# Patient Record
Sex: FEMALE | Race: WHITE
[De-identification: names, ages, dates, MRNs, and addresses within clinical notes are randomized per-mention and may not be internally consistent; named-entity substitution may affect disease eponyms.]

---

## 2020-02-09 ENCOUNTER — HOSPITAL ENCOUNTER (EMERGENCY)
Dept: HOSPITAL 11 - JP.ED | Age: 25
Discharge: HOME | End: 2020-02-09
Payer: COMMERCIAL

## 2020-02-09 DIAGNOSIS — Z88.1: ICD-10-CM

## 2020-02-09 DIAGNOSIS — Z88.5: ICD-10-CM

## 2020-02-09 DIAGNOSIS — K58.9: Primary | ICD-10-CM

## 2020-02-09 NOTE — EDM.PDOC
ED HPI GENERAL MEDICAL PROBLEM





- General


Chief Complaint: Abdominal Pain


Stated Complaint: ADBOMINAL PAIN


Time Seen by Provider: 02/09/20 11:05


Source of Information: Reports: Patient


History Limitations: Reports: No Limitations





- History of Present Illness


INITIAL COMMENTS - FREE TEXT/NARRATIVE: 





p arrived with a history of recurrent abdomanal pain which has been going on 

since she was about 10. When she has a episode she starts with lower abdomanal 

pain which moves up. She does not vomit. She has never been told what causes 

the pain. Her bm are regular normally . Whe n she has the pain she goe from a 

hard stool to a very loose one. She has not been told that she has a irritable 

bowel. She has cut iout gluten and milk products and that does not help. 


Onset: Other (started about 1 week ago. )


Duration: Hour(s):


Location: Reports: Abdomen


Associated Symptoms: Reports: No Other Symptoms


  ** Abdominal


Pain Score (Numeric/FACES): 4





- Related Data


 Allergies











Allergy/AdvReac Type Severity Reaction Status Date / Time


 


sulfamethoxazole AdvReac  Other Verified 02/09/20 10:40





[From Bactrim]     


 


trimethoprim [From Bactrim] AdvReac  Other Verified 02/09/20 10:40











Home Meds: 


 Home Meds





Levonorgestrel-Ethin Estradiol [Levonest-28 Tablet] 1 tab PO DAILY 02/09/20 [

History]


Multivitamin [Multivitamins] 1 tab PO DAILY 02/09/20 [History]











Past Medical History


HEENT History: Reports: Impaired Vision


Neurological History: Reports: None





- Past Surgical History


Head Surgeries/Procedures: Reports: None


HEENT Surgical History: Reports: Adenoidectomy, Tonsillectomy


Neurological Surgical History: Reports: Spinal Fusion


Dermatological Surgical History: Reports: None





Social & Family History





- Tobacco Use


Smoking Status *Q: Never Smoker


Second Hand Smoke Exposure: No





- Caffeine Use


Caffeine Use: Reports: Coffee





- Recreational Drug Use


Recreational Drug Use: No





ED ROS GENERAL





- Review of Systems


Review Of Systems: See Below


Constitutional: Reports: No Symptoms


HEENT: Reports: No Symptoms


Respiratory: Reports: No Symptoms


Cardiovascular: Reports: No Symptoms


Endocrine: Reports: No Symptoms


GI/Abdominal: Reports: Abdominal Pain, Other (pt is having episodes of 

abdomanal painwhich seem to be a lttle more frequent. She has a concern that 

she could have a GB problem because of the bloating that does occur. She states 

the pain starts in the lower abndoman and moves up. She states her stools do 

vary alot from loose to very hard. k)


: Reports: No Symptoms


Musculoskeletal: Reports: No Symptoms


Skin: Reports: No Symptoms





ED EXAM, GI/ABD





- Physical Exam


Exam: See Below


Text/Narrative:: 





Pt has somewhat of a chronic history of recurrent abdomanal pain. This starts 

in the lower abdoman and then moves up  She does have varible stools. 


Exam Limited By: No Limitations


General Appearance: Alert, Anxious, Mild Distress


Ears: Normal TMs


Nose: Normal Inspection


Throat/Mouth: Normal Inspection


Head: Atraumatic


Neck: Normal Inspection


Respiratory/Chest: No Respiratory Distress


Cardiovascular: Regular Rate, Rhythm


GI/Abdominal Exam: Soft, Other ( diffuse lower abdomanal tenderness. )


 (Female) Exam: Deferred


Rectal (Female) Exam: Deferred


Back Exam: Normal Inspection


Extremities: Normal Inspection


Neurological: Alert, Oriented, Normal Cognition


Psychiatric: Anxious





Course





- Vital Signs


Last Recorded V/S: 


 Last Vital Signs











Temp  36.6 C   02/09/20 10:48


 


Pulse  67   02/09/20 10:48


 


Resp  18   02/09/20 10:48


 


BP  136/83   02/09/20 10:48


 


Pulse Ox  99   02/09/20 10:48














- Orders/Labs/Meds


Orders: 


 Active Orders 24 hr











 Category Date Time Status


 


 Abdomen Ltd [US] Stat Exams  02/09/20 11:38 Ordered











Labs: 


 Laboratory Tests











  02/09/20 02/09/20 02/09/20 Range/Units





  11:09 11:11 11:11 


 


WBC    7.3  (4.5-11.0)  K/uL


 


RBC    4.82  (3.30-5.50)  M/uL


 


Hgb    13.3  (12.0-15.0)  g/dL


 


Hct    41.6  (36.0-48.0)  %


 


MCV    86  (80-98)  fL


 


MCH    28  (27-31)  pg


 


MCHC    32  (32-36)  %


 


Plt Count    280  (150-400)  K/uL


 


Neut % (Auto)    47  (36-66)  %


 


Lymph % (Auto)    44  (24-44)  %


 


Mono % (Auto)    6  (2-6)  %


 


Eos % (Auto)    2  (2-4)  %


 


Baso % (Auto)    1  (0-1)  %


 


Sodium     (140-148)  mmol/L


 


Potassium     (3.6-5.2)  mmol/L


 


Chloride     (100-108)  mmol/L


 


Carbon Dioxide     (21-32)  mmol/L


 


Anion Gap     (5.0-14.0)  mmol/L


 


BUN     (7-18)  mg/dL


 


Creatinine     (0.6-1.0)  mg/dL


 


Est Cr Clr Drug Dosing     mL/min


 


Estimated GFR (MDRD)     (>60)  


 


Glucose     ()  mg/dL


 


Calcium     (8.5-10.1)  mg/dL


 


Total Bilirubin     (0.2-1.0)  mg/dL


 


AST     (15-37)  U/L


 


ALT     (12-78)  U/L


 


Alkaline Phosphatase     ()  U/L


 


C-Reactive Protein   0.96 H   (0.0-0.3)  mg/dL


 


Total Protein     (6.4-8.2)  g/dL


 


Albumin     (3.4-5.0)  g/dL


 


Globulin     (2.3-3.5)  g/dL


 


Albumin/Globulin Ratio     (1.2-2.2)  


 


Urine Color  Yellow    (YELLOW)  


 


Urine Appearance  Clear    (CLEAR)  


 


Urine pH  7.5    (5.0-8.0)  


 


Ur Specific Gravity  1.015    (1.008-1.030)  


 


Urine Protein  Negative    (NEGATIVE)  mg/dL


 


Urine Glucose (UA)  Negative    (NEGATIVE)  mg/dL


 


Urine Ketones  Negative    (NEGATIVE)  mg/dL


 


Urine Occult Blood  Negative    (NEGATIVE)  


 


Urine Nitrite  Negative    (NEGATIVE)  


 


Urine Bilirubin  Negative    (NEGATIVE)  


 


Urine Urobilinogen  0.2    (0.2-1.0)  EU/dL


 


Ur Leukocyte Esterase  Negative    (NEGATIVE)  


 


Urine RBC  Not seen    (0-5)  


 


Urine WBC  Not seen    (0-5)  


 


Ur Epithelial Cells  Rare    


 


Amorphous Sediment  Not seen    


 


Urine Bacteria  Not seen    


 


Urine Mucus  Not seen    














  02/09/20 Range/Units





  11:11 


 


WBC   (4.5-11.0)  K/uL


 


RBC   (3.30-5.50)  M/uL


 


Hgb   (12.0-15.0)  g/dL


 


Hct   (36.0-48.0)  %


 


MCV   (80-98)  fL


 


MCH   (27-31)  pg


 


MCHC   (32-36)  %


 


Plt Count   (150-400)  K/uL


 


Neut % (Auto)   (36-66)  %


 


Lymph % (Auto)   (24-44)  %


 


Mono % (Auto)   (2-6)  %


 


Eos % (Auto)   (2-4)  %


 


Baso % (Auto)   (0-1)  %


 


Sodium  141  (140-148)  mmol/L


 


Potassium  3.8  (3.6-5.2)  mmol/L


 


Chloride  105  (100-108)  mmol/L


 


Carbon Dioxide  28  (21-32)  mmol/L


 


Anion Gap  8.4  (5.0-14.0)  mmol/L


 


BUN  10  (7-18)  mg/dL


 


Creatinine  1.0  (0.6-1.0)  mg/dL


 


Est Cr Clr Drug Dosing  61.23  mL/min


 


Estimated GFR (MDRD)  > 60  (>60)  


 


Glucose  91  ()  mg/dL


 


Calcium  8.7  (8.5-10.1)  mg/dL


 


Total Bilirubin  0.3  (0.2-1.0)  mg/dL


 


AST  16  (15-37)  U/L


 


ALT  29  (12-78)  U/L


 


Alkaline Phosphatase  76  ()  U/L


 


C-Reactive Protein   (0.0-0.3)  mg/dL


 


Total Protein  7.2  (6.4-8.2)  g/dL


 


Albumin  3.4  (3.4-5.0)  g/dL


 


Globulin  3.8 H  (2.3-3.5)  g/dL


 


Albumin/Globulin Ratio  0.9 L  (1.2-2.2)  


 


Urine Color   (YELLOW)  


 


Urine Appearance   (CLEAR)  


 


Urine pH   (5.0-8.0)  


 


Ur Specific Gravity   (1.008-1.030)  


 


Urine Protein   (NEGATIVE)  mg/dL


 


Urine Glucose (UA)   (NEGATIVE)  mg/dL


 


Urine Ketones   (NEGATIVE)  mg/dL


 


Urine Occult Blood   (NEGATIVE)  


 


Urine Nitrite   (NEGATIVE)  


 


Urine Bilirubin   (NEGATIVE)  


 


Urine Urobilinogen   (0.2-1.0)  EU/dL


 


Ur Leukocyte Esterase   (NEGATIVE)  


 


Urine RBC   (0-5)  


 


Urine WBC   (0-5)  


 


Ur Epithelial Cells   


 


Amorphous Sediment   


 


Urine Bacteria   


 


Urine Mucus   














- Re-Assessments/Exams


Free Text/Narrative Re-Assessment/Exam: 





02/09/20 13:36


lab work was normal  pt had a US of the  area and this was normal. 





Departure





- Departure


Time of Disposition: 13:37


Disposition: Home, Self-Care 01


Condition: Fair


Clinical Impression: 


 Irritable bowel syndrome








- Discharge Information


Referrals: 


Brittany Choi MD [Primary Care Provider] - 


Forms:  ED Department Discharge


Care Plan Goals: 


push fluid, increase fiber in the diet, fibercon 1 tab bid. At this time use 

mom and clear out the bowel then increase the fiber, regular exercise. If not 

better see Dr Choi at the clinic and possibly have a referal to 

Gastroenterology. 





Sepsis Event Note





- Evaluation


Sepsis Screening Result: No Definite Risk





- Focused Exam


Vital Signs: 


 Vital Signs











  Temp Pulse Resp BP Pulse Ox


 


 02/09/20 10:48  36.6 C  67  18  136/83  99


 


 02/09/20 10:44  36.6 C  67  18  136/83  99











Date Exam was Performed: 02/09/20


Time Exam was Performed: 13:23





- My Orders


Last 24 Hours: 


My Active Orders





02/09/20 11:38


Abdomen Ltd [US] Stat 














- Assessment/Plan


Last 24 Hours: 


My Active Orders





02/09/20 11:38


Abdomen Ltd [US] Stat

## 2020-02-09 NOTE — CRLUS
INDICATION:



Right upper quadrant abdominal pain. Nausea.



TECHNIQUE:



Ultrasound abdomen limited. Sonographic images of the right upper quadrant 

were obtained using gray-scale and color Doppler images.



COMPARISON:



None



FINDINGS:



Liver: Normal in size and echotexture. No masses. No intrahepatic biliary 

dilatation. 



Gallbladder: No stones or sludge. Normal wall thickness. No pericholecystic 

fluid. 



Common bile duct: 6 mm. 



Pancreas: Unremarkable as imaged. The tail of the pancreas is suboptimally 

visualized. Normal directional flow in the splenic vein. 



Right kidney: 10.4 cm in length. Normal echotexture and cortex. No masses, 

stones, or hydronephrosis. 



Vasculature: Visualized aorta and IVC are normal. No free fluid 

demonstrated. 



IMPRESSION:



Unremarkable right upper quadrant ultrasound.



Dictated by Jose Burgos MD @ 2/9/2020 1:46:23 PM



Dictated by: Jose Burgos MD @ 02/09/2020 13:46:39



(Electronically Signed)

## 2022-07-31 ENCOUNTER — HOSPITAL ENCOUNTER (EMERGENCY)
Dept: HOSPITAL 11 - JP.ED | Age: 27
Discharge: HOME | End: 2022-07-31
Payer: COMMERCIAL

## 2022-07-31 DIAGNOSIS — Z79.899: ICD-10-CM

## 2022-07-31 DIAGNOSIS — E06.3: ICD-10-CM

## 2022-07-31 DIAGNOSIS — T78.40XA: Primary | ICD-10-CM

## 2022-07-31 DIAGNOSIS — Z88.2: ICD-10-CM

## 2022-07-31 DIAGNOSIS — Z86.16: ICD-10-CM

## 2022-07-31 PROCEDURE — 96375 TX/PRO/DX INJ NEW DRUG ADDON: CPT

## 2022-07-31 PROCEDURE — 99283 EMERGENCY DEPT VISIT LOW MDM: CPT

## 2022-07-31 PROCEDURE — 96374 THER/PROPH/DIAG INJ IV PUSH: CPT
